# Patient Record
Sex: FEMALE | Race: BLACK OR AFRICAN AMERICAN | URBAN - METROPOLITAN AREA
[De-identification: names, ages, dates, MRNs, and addresses within clinical notes are randomized per-mention and may not be internally consistent; named-entity substitution may affect disease eponyms.]

---

## 2018-10-09 ENCOUNTER — EMERGENCY (EMERGENCY)
Facility: HOSPITAL | Age: 83
LOS: 1 days | End: 2018-10-09
Payer: SELF-PAY

## 2018-10-09 PROCEDURE — 99285 EMERGENCY DEPT VISIT HI MDM: CPT

## 2024-08-07 VITALS
HEIGHT: 60 IN | DIASTOLIC BLOOD PRESSURE: 68 MMHG | SYSTOLIC BLOOD PRESSURE: 137 MMHG | WEIGHT: 113 LBS | BODY MASS INDEX: 22.19 KG/M2 | HEART RATE: 101 BPM

## 2024-09-16 ENCOUNTER — NON-APPOINTMENT (OUTPATIENT)
Age: 89
End: 2024-09-16

## 2024-09-16 DIAGNOSIS — Z91.81 HISTORY OF FALLING: ICD-10-CM

## 2024-09-16 DIAGNOSIS — Z78.9 OTHER SPECIFIED HEALTH STATUS: ICD-10-CM

## 2024-09-16 PROBLEM — Z00.00 ENCOUNTER FOR PREVENTIVE HEALTH EXAMINATION: Status: ACTIVE | Noted: 2024-09-16

## 2024-09-16 RX ORDER — TORSEMIDE 20 MG/1
20 TABLET ORAL DAILY
Refills: 0 | Status: ACTIVE | COMMUNITY

## 2024-09-16 RX ORDER — SODIUM BICARBONATE 650 MG/1
650 TABLET ORAL TWICE DAILY
Refills: 0 | Status: ACTIVE | COMMUNITY

## 2024-09-16 RX ORDER — CHLORHEXIDINE GLUCONATE 4 %
325 (65 FE) LIQUID (ML) TOPICAL DAILY
Refills: 0 | Status: ACTIVE | COMMUNITY

## 2024-09-16 RX ORDER — NIFEDIPINE 30 MG/1
30 TABLET, EXTENDED RELEASE ORAL DAILY
Refills: 0 | Status: ACTIVE | COMMUNITY

## 2024-09-23 ENCOUNTER — APPOINTMENT (OUTPATIENT)
Age: 89
End: 2024-09-23
Payer: SELF-PAY

## 2024-09-23 VITALS
DIASTOLIC BLOOD PRESSURE: 69 MMHG | HEART RATE: 109 BPM | HEIGHT: 61 IN | WEIGHT: 113 LBS | SYSTOLIC BLOOD PRESSURE: 144 MMHG | BODY MASS INDEX: 21.34 KG/M2

## 2024-09-23 DIAGNOSIS — I10 ESSENTIAL (PRIMARY) HYPERTENSION: ICD-10-CM

## 2024-09-23 DIAGNOSIS — D63.1 CHRONIC KIDNEY DISEASE, UNSPECIFIED: ICD-10-CM

## 2024-09-23 DIAGNOSIS — E87.70 FLUID OVERLOAD, UNSPECIFIED: ICD-10-CM

## 2024-09-23 DIAGNOSIS — N18.5 CHRONIC KIDNEY DISEASE, STAGE 5: ICD-10-CM

## 2024-09-23 DIAGNOSIS — E83.39 OTHER DISORDERS OF PHOSPHORUS METABOLISM: ICD-10-CM

## 2024-09-23 DIAGNOSIS — E05.80 OTHER THYROTOXICOSIS W/OUT THYROTOXIC CRISIS OR STORM: ICD-10-CM

## 2024-09-23 DIAGNOSIS — N18.9 CHRONIC KIDNEY DISEASE, UNSPECIFIED: ICD-10-CM

## 2024-09-23 PROCEDURE — 99202 OFFICE O/P NEW SF 15 MIN: CPT

## 2024-09-23 RX ORDER — METOLAZONE 2.5 MG/1
2.5 TABLET ORAL EVERY OTHER DAY
Qty: 45 | Refills: 0 | Status: ACTIVE | COMMUNITY
Start: 2024-09-23 | End: 1900-01-01

## 2024-09-23 RX ORDER — TORSEMIDE 20 MG/1
20 TABLET ORAL DAILY
Qty: 180 | Refills: 1 | Status: ACTIVE | COMMUNITY
Start: 2024-09-23 | End: 1900-01-01

## 2024-09-23 NOTE — RESULTS/DATA
[TextEntry] : 9/5/2024 BUN/Cr 82/6.04 eGFR 6 K 4.8  HCO3 19 Hb 8.8   7/18/2024 BUN/Cr 82/5.35 K 4.6 HCO3 22 Hb 8.7 Urine cx with mixed genital gali   6/15/2024 P 4.6 BUN/Cr 79/5.06 eGFR 7 K 4.7 HCO3 25 Hb 9.0  Labs from [facility] sCr 5.6 from 5/02/24 BUN/Cr 74/4.4 K 3.9 HCO3 26 eGFR 8.6 P 4.6 5/4/24 Hb 10.1  Labs from [facility] sCr 4.1 from 7/2023, 3.6 from 1/09/23 3.04 10/03/22 3.48 from 7/14/2022

## 2024-09-23 NOTE — PLAN
[TextEntry] : Patient is a poor candidate for hemodialysis given her frailty and advanced age SHe and her daughter agree with the plan not to pursue hemodialysis  Will increase diuretics to torsemide 40 mg daily and metolazone 2.5 mg every other day  No need for pulmonary or cardiology evaluation as it would not change further management  Continue oral iron  Supportive care  Follow up in 1 month with repeat results prior  Patient and her daughter were recommended to contact the office if patient develops worsening lethargy and worsening fluid overload

## 2024-09-23 NOTE — REVIEW OF SYSTEMS
[TextEntry] :    General: Denies unintentional weight loss Ears/Nose/Throat: Denies sinusitis Cardiovascular: Denies chest pain, dyspnea, + peripheral edema Respiratory: Denies cough, hemoptysis Gastrointestinal: Denies nausea, vomiting, diarrhea Genitourinary:: Denies hematuria, dysuria Musculoskeletal: Denies joint pain,myalgias Skin: Denies rash,pruritus Neurologic: Denies headaches, blurry vision Endocrine: Denies polydipsia, no polyuria

## 2024-09-23 NOTE — HISTORY OF PRESENT ILLNESS
[Stage 5] : stage 5 [FreeTextEntry1] : 89 y.o. female with history of CKD stage 5, HTN, anemia came to follow up after recent hospitalization.  She was admitted to [facility] for lethargy and failure to thrive, and was found to have KENDRA on CKD, that improved after holding diuretics and given IVF.  Patient used to follow with Dr. Evangelista at [facility], and hemodialysis was discussed in the past, and she refused.  Patient's last labs were from 07/2023 with BUN/Cr 63/4.1, eGFR 9.  Today she is feeling well, has a good appetite, and her weakness has improved.  She has been having worsening peripheral edema, however, denies any SOB.  She is still urinating. No repeat labs were done after she was discharged from the hospital.  Her application for Medicaid is currently pending.   9/23/2024 Seen today  She was referred to be seen in-person today due to worsening lower exteremity edema Her PCP wanted the patient to follow up with Pulmonary and Cardiology as well As per patient's daughter , patient is sleeping during the day, and more active during the night  They still do not want to pursue hemodialysis

## 2024-09-23 NOTE — HISTORY OF PRESENT ILLNESS
[Stage 5] : stage 5 [FreeTextEntry1] : 89 y.o. female with history of CKD stage 5, HTN, anemia came to follow up after recent hospitalization.  She was admitted to [facility] for lethargy and failure to thrive, and was found to have KENDRA on CKD, that improved after holding diuretics and given IVF.  Patient used to follow with Dr. Evangelista at [facility], and hemodialysis was discussed in the past, and she refused.  Patient's last labs were from 07/2023 with BUN/Cr 63/4.1, eGFR 9.  Today she is feeling well, has a good appetite, and her weakness has improved.  She has been having worsening peripheral edema, however, denies any SOB.  She is still urinating. No repeat labs were done after she was discharged from the hospital.  Her application for Medicaid is currently pending.   9/23/2024 Seen today  She was referred to be seen in-person today due to worsening lower exteremity edema Her PCP wanted the patient to follow up with Pulmonary and Cardiology as well As per patient's daughter , patient is sleeping during the day, and more active during the night  They still do not want to pursue hemodialysis

## 2024-09-23 NOTE — RESULTS/DATA
[TextEntry] : 9/5/2024 BUN/Cr 82/6.04 eGFR 6 K 4.8  HCO3 19 Hb 8.8   7/18/2024 BUN/Cr 82/5.35 K 4.6 HCO3 22 Hb 8.7 Urine cx with mixed genital gali   6/15/2024 P 4.6 BUN/Cr 79/5.06 eGFR 7 K 4.7 HCO3 25 Hb 9.0  Labs from [facility] sCr 5.6 from 5/02/24 BUN/Cr 74/4.4 K 3.9 HCO3 26 eGFR 8.6 P 4.6 5/4/24 Hb 10.1  Labs from [facility] sCr 4.1 from 7/2023, 3.6 from 1/09/23 3.04 10/03/22 3.48 from 7/14/2022

## 2024-09-23 NOTE — ASSESSMENT
[FreeTextEntry1] : CKD stage 5, renal function is stable  Electrolytes are acceptable  Currently in fluid overload  HTN BP is controlled  Anemia of CKD, Hb is low, daughter would like to try oral iron first  Hyperphosphatemia P is at goal  Secondary hyperparathyroidism PTHi 523.5, vitamin D 40.5 Metabolic acidosis, HCO3 19 on sodium bicarbonate tablets

## 2024-09-23 NOTE — PHYSICAL EXAM
[TextEntry] :  GA NAD HEENT normal Skin color normal CV S1S2 normal 2+ pitting peripheral edema Respiratory breath sounds normal GI abdomen soft, non-tender MSK no deformities Neuro A and Ox3

## 2024-09-30 RX ORDER — TORSEMIDE 20 MG/1
20 TABLET ORAL DAILY
Qty: 180 | Refills: 1 | Status: ACTIVE | COMMUNITY
Start: 2024-09-30 | End: 1900-01-01

## 2024-10-02 RX ORDER — METOLAZONE 2.5 MG/1
2.5 TABLET ORAL
Qty: 45 | Refills: 0 | Status: ACTIVE | COMMUNITY
Start: 2024-09-30 | End: 1900-01-01

## 2024-10-30 ENCOUNTER — APPOINTMENT (OUTPATIENT)
Age: 89
End: 2024-10-30
Payer: SELF-PAY

## 2024-10-30 VITALS
HEART RATE: 102 BPM | SYSTOLIC BLOOD PRESSURE: 103 MMHG | BODY MASS INDEX: 22.19 KG/M2 | HEIGHT: 60 IN | DIASTOLIC BLOOD PRESSURE: 62 MMHG | WEIGHT: 113 LBS

## 2024-10-30 DIAGNOSIS — N18.9 CHRONIC KIDNEY DISEASE, UNSPECIFIED: ICD-10-CM

## 2024-10-30 DIAGNOSIS — E87.70 FLUID OVERLOAD, UNSPECIFIED: ICD-10-CM

## 2024-10-30 DIAGNOSIS — E83.39 OTHER DISORDERS OF PHOSPHORUS METABOLISM: ICD-10-CM

## 2024-10-30 DIAGNOSIS — N18.5 CHRONIC KIDNEY DISEASE, STAGE 5: ICD-10-CM

## 2024-10-30 DIAGNOSIS — E05.80 OTHER THYROTOXICOSIS W/OUT THYROTOXIC CRISIS OR STORM: ICD-10-CM

## 2024-10-30 DIAGNOSIS — D63.1 CHRONIC KIDNEY DISEASE, UNSPECIFIED: ICD-10-CM

## 2024-10-30 DIAGNOSIS — I10 ESSENTIAL (PRIMARY) HYPERTENSION: ICD-10-CM

## 2024-10-30 PROCEDURE — 99212 OFFICE O/P EST SF 10 MIN: CPT

## 2024-12-02 ENCOUNTER — APPOINTMENT (OUTPATIENT)
Age: 88
End: 2024-12-02
Payer: SELF-PAY

## 2024-12-02 PROCEDURE — 99442: CPT
